# Patient Record
Sex: MALE | Race: BLACK OR AFRICAN AMERICAN | NOT HISPANIC OR LATINO | URBAN - METROPOLITAN AREA
[De-identification: names, ages, dates, MRNs, and addresses within clinical notes are randomized per-mention and may not be internally consistent; named-entity substitution may affect disease eponyms.]

---

## 2017-07-21 ENCOUNTER — EMERGENCY (EMERGENCY)
Facility: HOSPITAL | Age: 42
LOS: 1 days | Discharge: HOME | End: 2017-07-21
Attending: EMERGENCY MEDICINE | Admitting: EMERGENCY MEDICINE
Payer: MEDICAID

## 2017-07-21 VITALS
TEMPERATURE: 98 F | DIASTOLIC BLOOD PRESSURE: 99 MMHG | OXYGEN SATURATION: 100 % | RESPIRATION RATE: 16 BRPM | HEART RATE: 78 BPM | SYSTOLIC BLOOD PRESSURE: 136 MMHG

## 2017-07-21 PROCEDURE — 71101 X-RAY EXAM UNILAT RIBS/CHEST: CPT | Mod: 26,RT

## 2017-07-21 PROCEDURE — 99284 EMERGENCY DEPT VISIT MOD MDM: CPT

## 2017-07-21 PROCEDURE — 71020: CPT | Mod: 26

## 2017-07-21 RX ORDER — IBUPROFEN 200 MG
800 TABLET ORAL ONCE
Qty: 0 | Refills: 0 | Status: COMPLETED | OUTPATIENT
Start: 2017-07-21 | End: 2017-07-21

## 2017-07-21 RX ADMIN — Medication 800 MILLIGRAM(S): at 23:31

## 2017-07-21 NOTE — ED PROVIDER NOTE - MEDICAL DECISION MAKING DETAILS
42 y/o M pt presents to the ED with right lower rib pain s/p crawling through a narrow window. Plan: XR NSAIDs, possible CT chest for possible displaced ribs.

## 2017-07-21 NOTE — ED PROVIDER NOTE - OBJECTIVE STATEMENT
42 y/o M pt with no significant PMHx or PSHx c/o sharp right sided rib pain x5 days non-radiating. Pt states that he was breaking into his own home through a narrow window 5 days ago. States that window compressed down on his chest wall and now notes pain to the rib area over the RUQ of the abd. States that he hears a popping and snapping sound when he moves his right arm. Pt took Motrin 600mg with relief. Pt came to the ED today to check if he has any risk factors. Denies fever, chills, cough, SOB or any other complaints. No medications currently.

## 2017-07-21 NOTE — ED ADULT TRIAGE NOTE - CHIEF COMPLAINT QUOTE
Pt arrives w/ c/o Rt ribcage pain - on Monday sustained injury to rt ribcage trying to enter home through basement window when he locked himself out.  Felt "a pop" and pain to rt ribcage underneath chest, worsening pain on Wednesday after playing basketball.  Denies Chest pain, denies sob.  No hx fractures or pmhx.  Applied icy-hot patch to Rt thorax w/ improvement to pain.  Experiencing no pain at rest.  Appearing comfortable and speaking in full sentences.

## 2017-07-21 NOTE — ED PROVIDER NOTE - PHYSICAL EXAMINATION
well appearing, moving right arm well lungs clr heart norm, belly soft nontender, right anteior lower ribs pain, FROM no bruising,.

## 2017-07-21 NOTE — ED PROVIDER NOTE - PROGRESS NOTE DETAILS
Dr. Cadena:  I personally performed the services described in the documentation, reviewed the documentation recorded by the scribe in my presence and it accurately and completely records my words and action. The scribe's documentation has been prepared under my direction and personally reviewed by me in its entirety. I confirm that the note above accurately reflects all work, treatment, procedures, and medical decision making performed by me. DAPHNE BECKETT, MD: S/O from Dr. Pimentel pending CT chest which resulted.  Dispo in computer.

## 2017-07-22 PROCEDURE — 71250 CT THORAX DX C-: CPT | Mod: 26
